# Patient Record
Sex: MALE | Race: WHITE | NOT HISPANIC OR LATINO | Employment: FULL TIME | URBAN - METROPOLITAN AREA
[De-identification: names, ages, dates, MRNs, and addresses within clinical notes are randomized per-mention and may not be internally consistent; named-entity substitution may affect disease eponyms.]

---

## 2023-07-18 ENCOUNTER — OFFICE VISIT (OUTPATIENT)
Dept: INTERNAL MEDICINE | Facility: CLINIC | Age: 33
End: 2023-07-18
Payer: COMMERCIAL

## 2023-07-18 VITALS
DIASTOLIC BLOOD PRESSURE: 73 MMHG | HEART RATE: 63 BPM | BODY MASS INDEX: 23.57 KG/M2 | SYSTOLIC BLOOD PRESSURE: 110 MMHG | OXYGEN SATURATION: 98 % | WEIGHT: 183.7 LBS | HEIGHT: 74 IN

## 2023-07-18 DIAGNOSIS — Z79.01 LONG TERM CURRENT USE OF ANTICOAGULANT THERAPY: ICD-10-CM

## 2023-07-18 DIAGNOSIS — Z00.00 ROUTINE GENERAL MEDICAL EXAMINATION AT A HEALTH CARE FACILITY: Primary | ICD-10-CM

## 2023-07-18 DIAGNOSIS — E80.6 HYPERBILIRUBINEMIA: ICD-10-CM

## 2023-07-18 DIAGNOSIS — E78.2 MIXED HYPERLIPIDEMIA: ICD-10-CM

## 2023-07-18 DIAGNOSIS — F10.10 EXCESSIVE DRINKING OF ALCOHOL: ICD-10-CM

## 2023-07-18 DIAGNOSIS — I82.4Y1 ACUTE DEEP VEIN THROMBOSIS (DVT) OF PROXIMAL END OF RIGHT LOWER EXTREMITY (H): ICD-10-CM

## 2023-07-18 PROBLEM — E04.1 NONTOXIC SINGLE THYROID NODULE: Status: ACTIVE | Noted: 2020-09-17

## 2023-07-18 PROBLEM — Z86.718 PERSONAL HISTORY OF DVT (DEEP VEIN THROMBOSIS): Status: ACTIVE | Noted: 2023-07-18

## 2023-07-18 PROBLEM — Z86.711 HISTORY OF PULMONARY EMBOLISM: Status: ACTIVE | Noted: 2020-09-06

## 2023-07-18 PROBLEM — I26.99 PULMONARY EMBOLI (H): Status: ACTIVE | Noted: 2020-09-06

## 2023-07-18 LAB
ALBUMIN SERPL BCG-MCNC: 5 G/DL (ref 3.5–5.2)
ALP SERPL-CCNC: 84 U/L (ref 40–129)
ALT SERPL W P-5'-P-CCNC: 95 U/L (ref 0–70)
ANION GAP SERPL CALCULATED.3IONS-SCNC: 15 MMOL/L (ref 7–15)
AST SERPL W P-5'-P-CCNC: 63 U/L (ref 0–45)
BILIRUB DIRECT SERPL-MCNC: 0.21 MG/DL (ref 0–0.3)
BILIRUB SERPL-MCNC: 0.9 MG/DL
BUN SERPL-MCNC: 7 MG/DL (ref 6–20)
CALCIUM SERPL-MCNC: 9.2 MG/DL (ref 8.6–10)
CHLORIDE SERPL-SCNC: 103 MMOL/L (ref 98–107)
CHOLEST SERPL-MCNC: 201 MG/DL
CREAT SERPL-MCNC: 0.96 MG/DL (ref 0.67–1.17)
DEPRECATED HCO3 PLAS-SCNC: 24 MMOL/L (ref 22–29)
ERYTHROCYTE [DISTWIDTH] IN BLOOD BY AUTOMATED COUNT: 11.9 % (ref 10–15)
GFR SERPL CREATININE-BSD FRML MDRD: >90 ML/MIN/1.73M2
GLUCOSE SERPL-MCNC: 80 MG/DL (ref 70–99)
HCT VFR BLD AUTO: 47.6 % (ref 40–53)
HDLC SERPL-MCNC: 71 MG/DL
HGB BLD-MCNC: 16.2 G/DL (ref 13.3–17.7)
LDLC SERPL CALC-MCNC: 112 MG/DL
MCH RBC QN AUTO: 32.9 PG (ref 26.5–33)
MCHC RBC AUTO-ENTMCNC: 34 G/DL (ref 31.5–36.5)
MCV RBC AUTO: 97 FL (ref 78–100)
NONHDLC SERPL-MCNC: 130 MG/DL
PLATELET # BLD AUTO: 209 10E3/UL (ref 150–450)
POTASSIUM SERPL-SCNC: 4.2 MMOL/L (ref 3.4–5.3)
PROT SERPL-MCNC: 7.4 G/DL (ref 6.4–8.3)
RBC # BLD AUTO: 4.93 10E6/UL (ref 4.4–5.9)
SODIUM SERPL-SCNC: 142 MMOL/L (ref 136–145)
TRIGL SERPL-MCNC: 91 MG/DL
WBC # BLD AUTO: 7.6 10E3/UL (ref 4–11)

## 2023-07-18 PROCEDURE — 99385 PREV VISIT NEW AGE 18-39: CPT | Performed by: INTERNAL MEDICINE

## 2023-07-18 PROCEDURE — 80061 LIPID PANEL: CPT | Performed by: INTERNAL MEDICINE

## 2023-07-18 PROCEDURE — 80053 COMPREHEN METABOLIC PANEL: CPT | Performed by: INTERNAL MEDICINE

## 2023-07-18 PROCEDURE — 36415 COLL VENOUS BLD VENIPUNCTURE: CPT | Performed by: INTERNAL MEDICINE

## 2023-07-18 PROCEDURE — 99214 OFFICE O/P EST MOD 30 MIN: CPT | Mod: 25 | Performed by: INTERNAL MEDICINE

## 2023-07-18 PROCEDURE — 82248 BILIRUBIN DIRECT: CPT | Performed by: INTERNAL MEDICINE

## 2023-07-18 PROCEDURE — 85027 COMPLETE CBC AUTOMATED: CPT | Performed by: INTERNAL MEDICINE

## 2023-07-18 RX ORDER — WARFARIN SODIUM 3 MG/1
TABLET ORAL
COMMUNITY
Start: 2023-02-21 | End: 2023-07-18

## 2023-07-18 ASSESSMENT — ENCOUNTER SYMPTOMS
PARESTHESIAS: 0
FEVER: 0
ABDOMINAL PAIN: 0
FREQUENCY: 0
DIARRHEA: 0
CONSTIPATION: 0
DIZZINESS: 0
NAUSEA: 0
CHILLS: 0
ARTHRALGIAS: 0
MYALGIAS: 0
HEARTBURN: 0
SORE THROAT: 0
SHORTNESS OF BREATH: 0
DYSURIA: 0
JOINT SWELLING: 0
NERVOUS/ANXIOUS: 0
EYE PAIN: 0
WEAKNESS: 0
HEMATOCHEZIA: 0
PALPITATIONS: 0
HEADACHES: 0
HEMATURIA: 0
COUGH: 0

## 2023-07-18 NOTE — PROGRESS NOTES
SUBJECTIVE:   CC: Graham is an 32 year old who presents for preventative health visit.     Healthy Habits:     Getting at least 3 servings of Calcium per day:  Yes    Bi-annual eye exam:  NO    Dental care twice a year:  NO    Sleep apnea or symptoms of sleep apnea:  None    Diet:  Regular (no restrictions)    Frequency of exercise:  4-5 days/week    Duration of exercise:  Greater than 60 minutes    Taking medications regularly:  Yes    Medication side effects:  Not applicable    Additional concerns today:  Yes    Graham presents today for a physical exam. This is the first time I have met Graham. We also discussed his warfarin use. He stopped taking it ~3 months ago. He does not desire to go back on it. He drinks 3-4 drinks per night every night.    Today's PHQ-2 Score:       7/18/2023    12:34 PM   PHQ-2 ( 1999 [employer])   Q1: Little interest or pleasure in doing things 0   Q2: Feeling down, depressed or hopeless 1   PHQ-2 Score 1   Q1: Little interest or pleasure in doing things Not at all   Q2: Feeling down, depressed or hopeless Several days   PHQ-2 Score 1     Social History     Tobacco Use     Smoking status: Never     Smokeless tobacco: Never   Substance Use Topics     Alcohol use: Not Currently     Alcohol/week: 7.0 standard drinks of alcohol     Types: 7 Standard drinks or equivalent per week     Comment: daily         7/18/2023    12:34 PM   Alcohol Use   Prescreen: >3 drinks/day or >7 drinks/week? Yes   AUDIT SCORE  10         7/18/2023    12:34 PM   AUDIT - Alcohol Use Disorders Identification Test - Reproduced from the World Health Organization Audit 2001 (Second Edition)   1.  How often do you have a drink containing alcohol? 4 or more times a week   2.  How many drinks containing alcohol do you have on a typical day when you are drinking? 3 or 4   3.  How often do you have five or more drinks on one occasion? Weekly   4.  How often during the last year have you found that you were not able to stop drinking  once you had started? Never   5.  How often during the last year have you failed to do what was normally expected of you because of drinking? Never   6.  How often during the last year have you needed a first drink in the morning to get yourself going after a heavy drinking session? Less than monthly   7.  How often during the last year have you had a feeling of guilt or remorse after drinking? Less than monthly   8.  How often during the last year have you been unable to remember what happened the night before because of your drinking? Never   9.  Have you or someone else been injured because of your drinking? No   10. Has a relative, friend, doctor or other health care worker been concerned about your drinking or suggested you cut down? No   TOTAL SCORE 10     Reviewed orders with patient. Reviewed health maintenance and updated orders accordingly - Yes    Labs reviewed in EPIC    Reviewed and updated as needed this visit by clinical staff   Tobacco  Allergies  Meds  Problems  Med Hx  Surg Hx  Fam Hx  Soc   Hx      Reviewed and updated as needed this visit by Provider   Tobacco  Allergies  Meds  Problems  Med Hx  Surg Hx  Fam Hx         Review of Systems   Constitutional: Negative for chills and fever.   HENT: Negative for congestion, ear pain, hearing loss and sore throat.    Eyes: Negative for pain and visual disturbance.   Respiratory: Negative for cough and shortness of breath.    Cardiovascular: Negative for chest pain, palpitations and peripheral edema.   Gastrointestinal: Negative for abdominal pain, constipation, diarrhea, heartburn, hematochezia and nausea.   Genitourinary: Negative for dysuria, frequency, genital sores, hematuria, impotence, penile discharge and urgency.   Musculoskeletal: Negative for arthralgias, joint swelling and myalgias.   Skin: Negative for rash.   Neurological: Negative for dizziness, weakness, headaches and paresthesias.   Psychiatric/Behavioral: Negative for mood  "changes. The patient is not nervous/anxious.      OBJECTIVE:   /73   Pulse 63   Ht 1.873 m (6' 1.75\")   Wt 83.3 kg (183 lb 11.2 oz)   SpO2 98%   BMI 23.75 kg/m      Physical Exam  GENERAL: In no distress.  EYES: Conjunctivae/corneas clear. EOMs grossly intact.  HENT: NC/AT, facies symmetric. Neck supple. No LAD or thyromegaly noted.  RESP: CTAB. No w/r/r.  CV: RRR, no m/r/g.  GI: NT, ND, without rebound or guarding, no CVA tenderness, Spencer's sign negative  MSK: Moves all four extremities freely.  SKIN: No significant ulcers, lesions or rashes on the visualized portions of the skin  NEURO: Alert. Oriented.  PSYCH: Linear thought process. Speech normal rate and volume. No tangential thoughts, hallucinations, or delusions.    Diagnostic Test Results: Labs reviewed in Epic    ASSESSMENT/PLAN:   Routine general medical examination at a health care facility  Reviewed PMH. Discussed healthcare maintenance issues, including cancer screenings, relevant immunizations, and cardiac risk factor screenings such as for cholesterol, HTN, and DM.  - PRIMARY CARE FOLLOW-UP SCHEDULING; Future    Excessive drinking of alcohol  Reports he drinks 25-30 drinks per week. Reviewed with him that his 11/2022 showed macrocytosis, transaminitis, and hyperbilirubinemia, possibly all due to excessive alcohol use. Strongly recommended he cut back. He admitted he drinks 'too much'. He was open to addiction medicine referral today which I did place.  - Adult Mental Health  Referral; Future    Long term current use of anticoagulant therapy  Acute deep vein thrombosis (DVT) of proximal end of right lower extremity (H)  Reviewed with Graham that I am not a thrombosis specialists but to my knowledge 2 unprovoked clots (with a strong FH of clots in his siblings) would lead me to recommend lifelong anticoagulation. He has been off warfarin for ~3 months. He does not desire to go back on it. Per notes, he apparently had extensions of " clots while on DOAC and Lovenox and thus was recommended he stay on warfarin indefinitely. He was able to verbalize the risks of not being on anticoagulation, including having another clot (including one that could be fatal). I offered formal thrombosis/hematology referral which he deferred for today. I again stated my recommendation would be he resumes anticoagulation indefinitely, and he thanked me for my recommendation but plans to remain off of it for now.  - Basic metabolic panel; Future  - CBC with platelets; Future    Mixed hyperlipidemia  Fasting labs today.  - Lipid panel reflex to direct LDL Fasting; Future    Hyperbilirubinemia  Seen on 2022 lab work. He denies known history of Gilbert's disease.  - Hepatic function panel; Future    COUNSELING:   Reviewed preventive health counseling, as reflected in patient instructions    He reports that he has never smoked. He has never used smokeless tobacco.    Dhiraj Corona MD  [facility]

## 2023-07-18 NOTE — PATIENT INSTRUCTIONS
- Our team will contact you via [employer] (if you sign up for it), telephone call (if results are urgent), or otherwise via letter in the mail with the results of today's lab tests once I have a chance to review them

## 2023-07-19 ENCOUNTER — TELEPHONE (OUTPATIENT)
Dept: ANTICOAGULATION | Facility: CLINIC | Age: 33
End: 2023-07-19
Payer: COMMERCIAL

## 2023-07-19 DIAGNOSIS — I26.99 PULMONARY EMBOLISM (H): ICD-10-CM

## 2023-07-19 DIAGNOSIS — Z86.711 HISTORY OF PULMONARY EMBOLISM: ICD-10-CM

## 2023-07-19 DIAGNOSIS — Z79.01 LONG TERM CURRENT USE OF ANTICOAGULANT THERAPY: Primary | ICD-10-CM

## 2023-07-19 DIAGNOSIS — I82.4Y1 ACUTE DEEP VEIN THROMBOSIS (DVT) OF PROXIMAL END OF RIGHT LOWER EXTREMITY (H): ICD-10-CM

## 2023-07-19 RX ORDER — WARFARIN SODIUM 3 MG/1
TABLET ORAL
Qty: 78 TABLET | Refills: 1 | Status: SHIPPED | OUTPATIENT
Start: 2023-07-19

## 2023-07-19 NOTE — LETTER
7/19/2023        RE: Graham Doyle  [address] [facility] [address]  [place] [address]

## 2023-07-19 NOTE — TELEPHONE ENCOUNTER
"ANTICOAGULATION  MANAGEMENT: NEW REFERRAL      SUBJECTIVE/OBJECTIVE     Graham Doyle, a 32 year old male  is newly referred to [facility].    Anticoagulation:    Previously on warfarin: Yes,  patient transferring from [facility] in [place], [place].  Approximate previous dose: 1.5 mg Mon, Wed, Fri; 3 mg all other days. Using 3 mg tablets. Possibly, per chart review, last INR was 2023?  Warfarin initiation date (approximate):  for DVT, saddle PE, stopped warfarin after 8 mo, then another small DVT rt leg and restarted warfarin.  Tried and failed Eliquis.    Indication(s): DVT   Goal Range: 2.0-3.0   Anticoagulation Bridge/Overlap: No   Referring provider: from PCP    General Dietary/Social Hx:    Typical vitamin K intake: low; consistent     Other dietary considerations: None     Social History:   Social History     Tobacco Use     Smoking status: Former     Types: Cigarettes     Quit date: 2022     Years since quittin.5     Smokeless tobacco: Never   Substance Use Topics     Alcohol use: Not Currently     Alcohol/week: 7.0 standard drinks of alcohol     Types: 7 Standard drinks or equivalent per week     Comment: daily     Drug use: Not Currently   h/o CBD gummies for sleep, h/o excessive ETOH use    In the past 2 weeks, patient estimates taking medications as instructed % of time: 100    Results:        No results for input(s): INR, [id number], F2, ALMWH in the last 168 hours.    Wt Readings from Last 2 Encounters:   23 83.3 kg (183 lb 11.2 oz)      Estimated body mass index is 23.75 kg/m  as calculated from the following:    Height as of 23: 1.873 m (6' 1.75\").    Weight as of 23: 83.3 kg (183 lb 11.2 oz).  Lab Results   Component Value Date    AST 63 (H) 2023     Lab Results   Component Value Date    CR 0.96 2023     Estimated Creatinine Clearance: 127.7 mL/min (based on SCr of 0.96 mg/dL).    ASSESSMENT       Goal INR 2-3, " standard for indication(s) above    On warfarin > 30 days; maintenance dose has been established    Maintenance dose established prior to referral     Patient states he was evaluated by Hematology in [place] and they did not find any genetic condition for why he had a DVT/PE or repeat. They only thing is it was in 2020 and 2021 and he had Covid 3x despite having all his vaccinations/boosters, so possibly related to that? He is open to seeing Hematology here and would like to possibly stop taking warfarin with the thought that his DVTs were provoked. He does note his sister had a DVT when she was pregnant and his brother had a DVT after a 17-hour long plane ride.   PLAN     Dosing Instructions: Continue your current warfarin dose with INR this week.      Summary  As of 7/19/2023    Full warfarin instructions:  1.5 mg every Mon, Wed, Fri; 3 mg all other days   Next INR check:  7/26/2023             Education provided:     Please call back if any changes to your diet, medications or how you've been taking warfarin    Taking warfarin: purpose of warfarin and how it works, take warfarin at same time each day; preferably in the evening, prescribed tablet strength and color, importance of following [facility] instructions vs instructions on the prescription bottle and Importance of taking warfarin as instructed    Goal range and lab monitoring: goal range and significance of current result, Importance of therapeutic range and Importance of following up at instructed interval    Dietary considerations: importance of consistent vitamin K intake, impact of vitamin K foods on INR, vitamin K content of foods, Impact of protein intake on INR  and importance of notifying [facility] to changes in diet    Healthy lifestyle considerations: potential interaction between warfarin and alcohol, limit alcohol intake to no more than 1 to 2 drinks in 24 hours if you choose to drink alcohol, avoid excessive alcohol drinking (binge drinking) while on  warfarin due to increased risk of bleeding from effect on INR and fall risk, impact of smoking or tobacco on INR, impact of exercise/activity level on INR and impact of CBD, marijuana, or medical marijuana on INR    Interaction IS anticipated between warfarin and many medications including prescription, OTC, herbal, supplements and especially NSAIDs and antibiotics    Symptom monitoring: monitoring for bleeding signs and symptoms, monitoring for clotting signs and symptoms, monitoring for stroke signs and symptoms, when to seek medical attention/emergency care, if you hit your head or have a bad fall seek emergency care and travel related clotting risk and prevention    Importance of notifying anticoagulation clinic for: changes in medications; a sooner lab recheck maybe needed, diarrhea, nausea/vomiting, reduced intake, cold/flu, and/or infections; a sooner lab recheck maybe needed, upcoming surgeries and procedures 2 weeks in advance and if you did not receive dosing instructions on the same day as your labs were checked    Written instructions provided    Contact [phone]  with any changes, questions or concerns.     Education still needed:     None required      Telephone call with Graham who verbalizes understanding and agrees to plan    Lab visit scheduled    Standing orders placed in Epic: Point of Care INR (Lab 5000)    Plan made per [facility] anticoagulation protocol    Santa Garvin, RN  Anticoagulation Clinic  7/19/2023

## 2023-07-26 ENCOUNTER — LAB (OUTPATIENT)
Dept: LAB | Facility: CLINIC | Age: 33
End: 2023-07-26
Payer: COMMERCIAL

## 2023-07-26 ENCOUNTER — ANTICOAGULATION THERAPY VISIT (OUTPATIENT)
Dept: ANTICOAGULATION | Facility: CLINIC | Age: 33
End: 2023-07-26

## 2023-07-26 DIAGNOSIS — Z86.711 HISTORY OF PULMONARY EMBOLISM: ICD-10-CM

## 2023-07-26 DIAGNOSIS — I26.99 PULMONARY EMBOLISM (H): ICD-10-CM

## 2023-07-26 DIAGNOSIS — Z79.01 LONG TERM CURRENT USE OF ANTICOAGULANT THERAPY: ICD-10-CM

## 2023-07-26 DIAGNOSIS — I82.4Y1 ACUTE DEEP VEIN THROMBOSIS (DVT) OF PROXIMAL END OF RIGHT LOWER EXTREMITY (H): ICD-10-CM

## 2023-07-26 DIAGNOSIS — I82.4Y1 ACUTE DEEP VEIN THROMBOSIS (DVT) OF PROXIMAL END OF RIGHT LOWER EXTREMITY (H): Primary | ICD-10-CM

## 2023-07-26 LAB — INR BLD: 1.4 (ref 0.9–1.1)

## 2023-07-26 PROCEDURE — 85610 PROTHROMBIN TIME: CPT

## 2023-07-26 PROCEDURE — 36416 COLLJ CAPILLARY BLOOD SPEC: CPT

## 2023-07-26 NOTE — PROGRESS NOTES
ANTICOAGULATION MANAGEMENT     Graham Doyle 32 year old male is on warfarin with subtherapeutic INR result. (Goal INR 2.0-3.0)    Recent labs: (last 7 days)     07/26/23  0838   INR 1.4*       ASSESSMENT     Source(s): Chart Review and Patient/Caregiver Call     Warfarin doses taken: Warfarin taken as instructed  Diet: Change in alcohol intake may be affecting INR. Having significantly less alcohol intake than when he was on his previous warfarin dose back in [place] . This is an ongoing change  Medication/supplement changes: None noted  New illness, injury, or hospitalization: No  Signs or symptoms of bleeding or clotting: No  Previous result:  therapeutic in [place] , but not changed since February 2023  Additional findings: None       PLAN     Recommended plan for ongoing change(s) affecting INR     Dosing Instructions: Increase your warfarin dose (27% change) with next INR in 5 days       Summary  As of 7/26/2023      Full warfarin instructions:  3 mg every day   Next INR check:  7/31/2023               Telephone call with Graham who verbalizes understanding and agrees to plan    Lab visit scheduled    Education provided:   Please call back if any changes to your diet, medications or how you've been taking warfarin  Healthy lifestyle considerations: potential interaction between warfarin and alcohol, limit alcohol intake to no more than 1 to 2 drinks in 24 hours if you choose to drink alcohol, avoid excessive alcohol drinking (binge drinking) while on warfarin due to increased risk of bleeding from effect on INR and fall risk, and impact of CBD, marijuana, or medical marijuana on INR  Contact [phone]  with any changes, questions or concerns.     Plan made with [facility] Pharmacist Bibi Garvin, RN  Anticoagulation Clinic  7/26/2023    _______________________________________________________________________     Anticoagulation Episode Summary       Current INR goal:  2.0-3.0    TTR:  --   Target end date:  Indefinite   Send INR reminders to:  [facility]    Indications    Acute deep vein thrombosis (DVT) of proximal end of right lower extremity (H) [I82.4Y1]  Long term current use of anticoagulant therapy [Z79.01]  Pulmonary embolism (H) [I26.99]             Comments:  first started warfarin in 2020 with DVT/saddle PE, on warfarin x8 mo then stopped, had recurrent DVT several months later and restarted warfarin             Anticoagulation Care Providers       Provider Role Specialty Phone number    Dhiraj Martinez MD Referring Internal Medicine [phone]

## 2023-07-31 ENCOUNTER — ANTICOAGULATION THERAPY VISIT (OUTPATIENT)
Dept: ANTICOAGULATION | Facility: CLINIC | Age: 33
End: 2023-07-31

## 2023-07-31 ENCOUNTER — LAB (OUTPATIENT)
Dept: LAB | Facility: CLINIC | Age: 33
End: 2023-07-31
Payer: COMMERCIAL

## 2023-07-31 DIAGNOSIS — Z86.711 HISTORY OF PULMONARY EMBOLISM: ICD-10-CM

## 2023-07-31 DIAGNOSIS — I26.99 PULMONARY EMBOLISM (H): ICD-10-CM

## 2023-07-31 DIAGNOSIS — Z79.01 LONG TERM CURRENT USE OF ANTICOAGULANT THERAPY: ICD-10-CM

## 2023-07-31 DIAGNOSIS — I82.4Y1 ACUTE DEEP VEIN THROMBOSIS (DVT) OF PROXIMAL END OF RIGHT LOWER EXTREMITY (H): Primary | ICD-10-CM

## 2023-07-31 DIAGNOSIS — I82.4Y1 ACUTE DEEP VEIN THROMBOSIS (DVT) OF PROXIMAL END OF RIGHT LOWER EXTREMITY (H): ICD-10-CM

## 2023-07-31 LAB — INR BLD: 1.8 (ref 0.9–1.1)

## 2023-07-31 PROCEDURE — 85610 PROTHROMBIN TIME: CPT

## 2023-07-31 PROCEDURE — 36416 COLLJ CAPILLARY BLOOD SPEC: CPT

## 2023-07-31 NOTE — PROGRESS NOTES
ANTICOAGULATION MANAGEMENT     Graham Doyle 32 year old male is on warfarin with subtherapeutic INR result. (Goal INR 2.0-3.0)    Recent labs: (last 7 days)     07/31/23  1417   INR 1.8*       ASSESSMENT     Source(s): Chart Review and Patient/Caregiver Call     Warfarin doses taken: Warfarin taken as instructed  Diet: No new diet changes identified  Medication/supplement changes: None noted  New illness, injury, or hospitalization: No  Signs or symptoms of bleeding or clotting: No  Previous result: Supratherapeutic  Additional findings: None       PLAN     Recommended plan for no diet, medication or health factor changes affecting INR     Dosing Instructions: Increase your warfarin dose (7.1% change) with next INR in 4 days       Summary  As of 7/31/2023      Full warfarin instructions:  4.5 mg every Mon; 3 mg all other days   Next INR check:  8/4/2023               Telephone call with Graham who verbalizes understanding and agrees to plan    Lab visit scheduled    Education provided:   Please call back if any changes to your diet, medications or how you've been taking warfarin    Plan made with [facility] Pharmacist Bibi Tidwell RN  Anticoagulation Clinic  7/31/2023    _______________________________________________________________________     Anticoagulation Episode Summary       Current INR goal:  2.0-3.0   TTR:  0.0 % (3 d)   Target end date:  Indefinite   Send INR reminders to:  [facility]    Indications    Acute deep vein thrombosis (DVT) of proximal end of right lower extremity (H) [I82.4Y1]  Long term current use of anticoagulant therapy [Z79.01]  Pulmonary embolism (H) [I26.99]             Comments:  first started warfarin in 2020 with DVT/saddle PE, on warfarin x8 mo then stopped, had recurrent DVT several months later and restarted warfarin             Anticoagulation Care Providers       Provider Role Specialty Phone number    Dhiraj Martinez MD Referring  Internal Medicine [phone]

## 2023-08-04 ENCOUNTER — ANTICOAGULATION THERAPY VISIT (OUTPATIENT)
Dept: ANTICOAGULATION | Facility: CLINIC | Age: 33
End: 2023-08-04

## 2023-08-04 ENCOUNTER — LAB (OUTPATIENT)
Dept: LAB | Facility: CLINIC | Age: 33
End: 2023-08-04
Payer: COMMERCIAL

## 2023-08-04 DIAGNOSIS — Z86.711 HISTORY OF PULMONARY EMBOLISM: ICD-10-CM

## 2023-08-04 DIAGNOSIS — I26.99 PULMONARY EMBOLISM (H): ICD-10-CM

## 2023-08-04 DIAGNOSIS — I82.4Y1 ACUTE DEEP VEIN THROMBOSIS (DVT) OF PROXIMAL END OF RIGHT LOWER EXTREMITY (H): ICD-10-CM

## 2023-08-04 DIAGNOSIS — Z79.01 LONG TERM CURRENT USE OF ANTICOAGULANT THERAPY: ICD-10-CM

## 2023-08-04 DIAGNOSIS — I82.4Y1 ACUTE DEEP VEIN THROMBOSIS (DVT) OF PROXIMAL END OF RIGHT LOWER EXTREMITY (H): Primary | ICD-10-CM

## 2023-08-04 LAB — INR BLD: 2.2 (ref 0.9–1.1)

## 2023-08-04 PROCEDURE — 85610 PROTHROMBIN TIME: CPT

## 2023-08-04 PROCEDURE — 36416 COLLJ CAPILLARY BLOOD SPEC: CPT

## 2023-08-04 NOTE — PROGRESS NOTES
ANTICOAGULATION MANAGEMENT     Graham Doyle 32 year old male is on warfarin with therapeutic INR result. (Goal INR 2.0-3.0)    Recent labs: (last 7 days)     08/04/23  1530   INR 2.2*       ASSESSMENT     Source(s): Chart Review and Patient/Caregiver Call     Warfarin doses taken: Warfarin taken as instructed  Diet: No new diet changes identified  Medication/supplement changes: None noted  New illness, injury, or hospitalization: No  Signs or symptoms of bleeding or clotting: No  Previous result: Subtherapeutic  Additional findings: None       PLAN     Recommended plan for no diet, medication or health factor changes affecting INR     Dosing Instructions: Continue your current warfarin dose with next INR in 1 week       Summary  As of 8/4/2023      Full warfarin instructions:  4.5 mg every Mon; 3 mg all other days   Next INR check:  8/11/2023               Telephone call with Graham who verbalizes understanding and agrees to plan    Lab visit scheduled    Education provided:   Please call back if any changes to your diet, medications or how you've been taking warfarin  Healthy lifestyle considerations: potential interaction between warfarin and alcohol, limit alcohol intake to no more than 1 to 2 drinks in 24 hours if you choose to drink alcohol, and avoid excessive alcohol drinking (binge drinking) while on warfarin due to increased risk of bleeding from effect on INR and fall risk  Contact [phone]  with any changes, questions or concerns.     Plan made per [facility] anticoagulation protocol    Santa Garvin RN  Anticoagulation Clinic  8/4/2023    _______________________________________________________________________     Anticoagulation Episode Summary       Current INR goal:  2.0-3.0   TTR:  26.5 % (1 wk)   Target end date:  Indefinite   [facility]    Indications    Acute deep vein thrombosis (DVT) of proximal end of right lower extremity (H) [I82.4Y1]  Long term  current use of anticoagulant therapy [Z79.01]  Pulmonary embolism (H) [I26.99]             Comments:  first started warfarin in 2020 with DVT/saddle PE, on warfarin x8 mo then stopped, had recurrent DVT several months later and restarted warfarin             Anticoagulation Care Providers       Provider Role Specialty Phone number    Dhiraj Martinez MD Referring Internal Medicine [phone]

## 2023-08-04 NOTE — PROGRESS NOTES
ANTICOAGULATION MANAGEMENT     Graham Doyle 32 year old male is on warfarin with therapeutic INR result. (Goal INR 2.0-3.0)    Recent labs: (last 7 days)     08/04/23  1530   INR 2.2*       ASSESSMENT     Source(s): Chart Review  Previous INR was Subtherapeutic  Medication, diet, health changes since last INR chart reviewed; none identified  Pt transferred from [place], overall has been drinking less ETOH since moving here so needing more weekly warfarin for lower INR labs. Finally therapeutic this week.     PLAN     Unable to reach Graham today.    Left message to 3 mg each night this weekend. Request call back for assessment.    Follow up required to confirm warfarin dose taken and assess for changes    Santa Garvin, RN  Anticoagulation Clinic  8/4/2023

## 2023-08-11 ENCOUNTER — LAB (OUTPATIENT)
Dept: LAB | Facility: CLINIC | Age: 33
End: 2023-08-11
Payer: COMMERCIAL

## 2023-08-11 ENCOUNTER — ANTICOAGULATION THERAPY VISIT (OUTPATIENT)
Dept: ANTICOAGULATION | Facility: CLINIC | Age: 33
End: 2023-08-11

## 2023-08-11 DIAGNOSIS — Z79.01 LONG TERM CURRENT USE OF ANTICOAGULANT THERAPY: ICD-10-CM

## 2023-08-11 DIAGNOSIS — I26.99 PULMONARY EMBOLISM (H): ICD-10-CM

## 2023-08-11 DIAGNOSIS — I82.4Y1 ACUTE DEEP VEIN THROMBOSIS (DVT) OF PROXIMAL END OF RIGHT LOWER EXTREMITY (H): ICD-10-CM

## 2023-08-11 DIAGNOSIS — I82.4Y1 ACUTE DEEP VEIN THROMBOSIS (DVT) OF PROXIMAL END OF RIGHT LOWER EXTREMITY (H): Primary | ICD-10-CM

## 2023-08-11 DIAGNOSIS — Z86.711 HISTORY OF PULMONARY EMBOLISM: ICD-10-CM

## 2023-08-11 LAB — INR BLD: 2.9 (ref 0.9–1.1)

## 2023-08-11 PROCEDURE — 36416 COLLJ CAPILLARY BLOOD SPEC: CPT

## 2023-08-11 PROCEDURE — 85610 PROTHROMBIN TIME: CPT

## 2023-08-11 NOTE — PROGRESS NOTES
ANTICOAGULATION MANAGEMENT     Graham Doyle 32 year old male is on warfarin with therapeutic INR result. (Goal INR 2.0-3.0)    Recent labs: (last 7 days)     08/11/23  0920   INR 2.9*       ASSESSMENT     Source(s): Chart Review and Patient/Caregiver Call     Warfarin doses taken: Warfarin taken as instructed  Diet: Was out of town over the weekend so extra greens but also alittle more alcohol  Medication/supplement changes: None noted  New illness, injury, or hospitalization: No  Signs or symptoms of bleeding or clotting: No  Previous result: Therapeutic last visit; previously outside of goal range  Additional findings: None       PLAN     Recommended plan for no diet, medication or health factor changes affecting INR     Dosing Instructions: Continue your current warfarin dose with next INR in 1 week       Summary  As of 8/11/2023      Full warfarin instructions:  4.5 mg every Mon; 3 mg all other days   Next INR check:  8/18/2023               Telephone call with Graham who verbalizes understanding and agrees to plan    Lab visit scheduled    Education provided:   Please call back if any changes to your diet, medications or how you've been taking warfarin    Plan made per [facility] anticoagulation protocol    Carolee Tidwell RN  Anticoagulation Clinic  8/11/2023    _______________________________________________________________________     Anticoagulation Episode Summary       Current INR goal:  2.0-3.0   TTR:  60.9 % (2 wk)   Target end date:  Indefinite   Send INR reminders to:  [facility]    Indications    Acute deep vein thrombosis (DVT) of proximal end of right lower extremity (H) [I82.4Y1]  Long term current use of anticoagulant therapy [Z79.01]  Pulmonary embolism (H) [I26.99]             Comments:  first started warfarin in 2020 with DVT/saddle PE, on warfarin x8 mo then stopped, had recurrent DVT several months later and restarted warfarin             Anticoagulation Care Providers        Provider Role Specialty Phone number    Dhiraj Martinez MD Referring Internal Medicine [phone]

## 2023-08-18 ENCOUNTER — LAB (OUTPATIENT)
Dept: LAB | Facility: CLINIC | Age: 33
End: 2023-08-18
Payer: COMMERCIAL

## 2023-08-18 ENCOUNTER — ANTICOAGULATION THERAPY VISIT (OUTPATIENT)
Dept: ANTICOAGULATION | Facility: CLINIC | Age: 33
End: 2023-08-18

## 2023-08-18 DIAGNOSIS — I82.4Y1 ACUTE DEEP VEIN THROMBOSIS (DVT) OF PROXIMAL END OF RIGHT LOWER EXTREMITY (H): Primary | ICD-10-CM

## 2023-08-18 DIAGNOSIS — I26.99 PULMONARY EMBOLISM (H): ICD-10-CM

## 2023-08-18 DIAGNOSIS — Z79.01 LONG TERM CURRENT USE OF ANTICOAGULANT THERAPY: ICD-10-CM

## 2023-08-18 DIAGNOSIS — Z86.711 HISTORY OF PULMONARY EMBOLISM: ICD-10-CM

## 2023-08-18 DIAGNOSIS — I82.4Y1 ACUTE DEEP VEIN THROMBOSIS (DVT) OF PROXIMAL END OF RIGHT LOWER EXTREMITY (H): ICD-10-CM

## 2023-08-18 LAB — INR BLD: 3.6 (ref 0.9–1.1)

## 2023-08-18 PROCEDURE — 85610 PROTHROMBIN TIME: CPT

## 2023-08-18 PROCEDURE — 36416 COLLJ CAPILLARY BLOOD SPEC: CPT

## 2023-08-18 NOTE — PROGRESS NOTES
ANTICOAGULATION MANAGEMENT     Graham Doyle 32 year old male is on warfarin with supratherapeutic INR result. (Goal INR 2.0-3.0)    Recent labs: (last 7 days)     08/18/23  0745   INR 3.6*       ASSESSMENT     Source(s): Chart Review and Patient/Caregiver Call     Warfarin doses taken: Warfarin taken as instructed  Diet: Decreased greens/vitamin K in diet; plans to resume previous intake He does have some alcohol but will try and lessen that some  Medication/supplement changes: None noted  New illness, injury, or hospitalization: No  Signs or symptoms of bleeding or clotting: No  Previous result: Therapeutic last 2(+) visits  Additional findings: None       PLAN     Recommended plan for temporary change(s) affecting INR     Dosing Instructions: decrease your warfarin dose (6.7% change) with next INR in 2 weeks       Summary  As of 8/18/2023      Full warfarin instructions:  8/18: 1.5 mg; Otherwise 3 mg every day   Next INR check:  9/1/2023               Telephone call with Graham who verbalizes understanding and agrees to plan    Lab visit scheduled    Education provided:   Please call back if any changes to your diet, medications or how you've been taking warfarin    Plan made per [facility] anticoagulation protocol    Carolee Tidwell RN  Anticoagulation Clinic  8/18/2023    _______________________________________________________________________     Anticoagulation Episode Summary       Current INR goal:  2.0-3.0   TTR:  45.8 % (3 wk)   Target end date:  Indefinite   Send INR reminders to:  [facility]    Indications    Acute deep vein thrombosis (DVT) of proximal end of right lower extremity (H) [I82.4Y1]  Long term current use of anticoagulant therapy [Z79.01]  Pulmonary embolism (H) [I26.99]             Comments:  first started warfarin in 2020 with DVT/saddle PE, on warfarin x8 mo then stopped, had recurrent DVT several months later and restarted warfarin             Anticoagulation Care  Providers       Provider Role Specialty Phone number    Dhiraj Martinez MD Referring Internal Medicine [phone]

## 2023-08-28 ENCOUNTER — MYC MEDICAL ADVICE (OUTPATIENT)
Dept: INTERNAL MEDICINE | Facility: CLINIC | Age: 33
End: 2023-08-28
Payer: COMMERCIAL

## 2023-08-28 ENCOUNTER — OFFICE VISIT (OUTPATIENT)
Dept: PEDIATRICS | Facility: CLINIC | Age: 33
End: 2023-08-28
Payer: COMMERCIAL

## 2023-08-28 ENCOUNTER — HOSPITAL ENCOUNTER (OUTPATIENT)
Dept: ULTRASOUND IMAGING | Facility: CLINIC | Age: 33
Discharge: HOME | End: 2023-08-28
Attending: PHYSICIAN ASSISTANT | Admitting: PHYSICIAN ASSISTANT
Payer: COMMERCIAL

## 2023-08-28 ENCOUNTER — NURSE TRIAGE (OUTPATIENT)
Dept: INTERNAL MEDICINE | Facility: CLINIC | Age: 33
End: 2023-08-28

## 2023-08-28 VITALS
RESPIRATION RATE: 18 BRPM | HEART RATE: 64 BPM | DIASTOLIC BLOOD PRESSURE: 79 MMHG | TEMPERATURE: 98.6 F | BODY MASS INDEX: 23.53 KG/M2 | WEIGHT: 182 LBS | SYSTOLIC BLOOD PRESSURE: 116 MMHG | OXYGEN SATURATION: 95 %

## 2023-08-28 DIAGNOSIS — Z79.01 LONG TERM CURRENT USE OF ANTICOAGULANT THERAPY: ICD-10-CM

## 2023-08-28 DIAGNOSIS — M25.471 RIGHT ANKLE SWELLING: ICD-10-CM

## 2023-08-28 DIAGNOSIS — I26.92 SADDLE EMBOLUS OF PULMONARY ARTERY, UNSPECIFIED CHRONICITY, UNSPECIFIED WHETHER ACUTE COR PULMONALE PRESENT (H): ICD-10-CM

## 2023-08-28 DIAGNOSIS — Z86.718 H/O DEEP VENOUS THROMBOSIS: ICD-10-CM

## 2023-08-28 DIAGNOSIS — M79.651 PAIN OF RIGHT THIGH: Primary | ICD-10-CM

## 2023-08-28 PROCEDURE — 93971 EXTREMITY STUDY: CPT | Mod: RT

## 2023-08-28 PROCEDURE — 99215 OFFICE O/P EST HI 40 MIN: CPT | Performed by: PHYSICIAN ASSISTANT

## 2023-08-28 NOTE — RESULT ENCOUNTER NOTE
Results discussed directly with patient while patient was present. Any further details documented in the note.   Laura Christopher PA-C

## 2023-08-28 NOTE — TELEPHONE ENCOUNTER
Triage huddled with [facility] provider and pt was informed he can be seen at 4:30 PM today. Pt verbalized agreement with plan. He was given [facility] information.

## 2023-08-28 NOTE — PROGRESS NOTES
Assessment & Plan     Pain of right thigh  H/O deep venous thrombosis  Saddle embolus of pulmonary artery, unspecified chronicity, unspecified whether acute cor pulmonale present (H)  Long term current use of anticoagulant therapy  Stat ultrasound reassuring const acute DVT.  Evidence of chronic DVT visible on ultrasound.  Suspect right thigh pain musculoskeletal in etiology.  Recommend Voltaren gel, heat or ice in 20-minute intervals, gentle massage, acetaminophen 1000 mg 3 times daily as needed.  If worsening or not improving follow-up with PCP or orthopedics.  Patient voiced understanding and agreement.  - US Lower Extremity Venous Duplex Right    43 minutes spent by me on the date of the encounter doing chart review, history and exam, documentation and further activities per the note    Review of prior external note(s) from - [facility] information from [facility] reviewed     Return in about 2 weeks (around 9/11/2023) for Evaluation if worsening or not improving.    Laura Christopher PA-C  [facility] DUANE Stevenson is a 32 year old, presenting for the following health issues:  Deep Vein Thrombosis (R lower extremity pain X 2 days , POS for DVT 2020, hx of saddle PE)         No data to display                HPI     Evaluation for possible DVT  Onset/Duration: X 2 days  Description:       Location: Right lower extremity       Redness: no        Pain: 4/10       Warmth: YES       Joint swelling YES- ankle swelling  Progression of symptoms same  Accompanying signs and symptoms:       Fevers: no        Numbness/tingling/weakness: YES- yesterday       Chest pain/pleurisy: no        Shortness of breath: no   History        Trauma: no         Recent travel/when: no         Previous history of DVT: YES- DVT R leg - unprovoked (was initially on Xarelto but changed to coumadin as saddle PE was noted/concern for progression), Saddle PE 2020        Family history of DVT: YES-  sister during pregnancy, brother post ACL surgery, then flight.        Recent surgery: no   Aggravating factors include: sitting  Therapies tried and outcome: none, Tylenol PRN, still currently taking Warfarin  Prior surgery on arteries of veins in this area: No    IVC filter removed 11/20/2020. (Placed 9/7/2020).  No hypercoagulability condition noted personally or in family.  Had work-up in 2020.    Review of Systems   Constitutional, HEENT, cardiovascular, pulmonary, GI, , musculoskeletal, neuro, skin, endocrine and psych systems are negative, except as otherwise noted.      Objective    /79 (BP Location: Right arm, Patient Position: Chair, Cuff Size: Adult Regular)   Pulse 64   Temp 98.6  F (37  C) (Oral)   Resp 18   Wt 82.6 kg (182 lb)   SpO2 95%   BMI 23.53 kg/m    Body mass index is 23.53 kg/m .  Physical Exam   GENERAL: healthy, alert and no distress  EYES: Eyes grossly normal to inspection, PERRL and conjunctivae and sclerae normal  RESP: lungs clear to auscultation - no rales, rhonchi or wheezes  CV: regular rate and rhythm, normal S1 S2, no S3 or S4, no murmur, click or rub, no peripheral edema and peripheral pulses strong  MS: Right thigh circumference 1 cm larger than contralateral side.  Tenderness along medial aspect of thigh to deep palpation.  No palpable abnormality in this region.  No redness or warmth noted of skin.  No calf tenderness in the entirety of the right lower extremity.  SKIN: no suspicious lesions or rashes  NEURO: Normal strength and tone, mentation intact and speech normal  PSYCH: mentation appears normal, affect normal/bright    Results for orders placed or performed during the hospital encounter of 08/28/23   US Lower Extremity Venous Duplex Right     Status: None    Narrative    EXAM: US LOWER EXTREMITY VENOUS DUPLEX RIGHT  LOCATION: [facility]  DATE: 8/28/2023    INDICATION: hx of DVT 2020   unprovoked w saddle PE a few days later   on  anticoagulation   swelling and pain of Right ankle x 2 days  COMPARISON: None available at the time of dictation  TECHNIQUE: Venous Duplex ultrasound of the right lower extremity with and without compression, augmentation and duplex. Color flow and spectral Doppler with waveform analysis performed.    FINDINGS: Exam includes the common femoral, femoral, popliteal, and contralateral common femoral veins as well as segmentally visualized deep calf veins and greater saphenous vein.     RIGHT: Partial compressibility of the distal popliteal vein with a slightly echogenic and linear intraluminal filling defect accounting for the partial compressibility, an appearance favoring sequelae of prior/chronic deep venous thrombosis. Flow is   preserved, and the visualized calf veins and remaining femoral - popliteal venous segments are fully compressible and widely patent. There is no convincing evidence of acute DVT.      Impression    IMPRESSION:    Findings in the distal popliteal vein favoring the sequelae of prior/chronic deep venous thrombosis with no convincing evidence of acute DVT. If clinical symptoms are persistent or worsen, repeat imaging could be considered

## 2023-08-28 NOTE — TELEPHONE ENCOUNTER
Pt reports pain radiating from behind his right knee to his groin see my chart message below. Pt denies chest pain, leg swelling or injury. Has hx of DVT and is currently taking warfarin. Pt can go to [facility] today. Message was left requesting call back from [facility] provider.     Over the last few days the pain in my right leg has migrated up behind my knee and almost to the groin. I have been very tired too. Do you recommend I visit to get an ultrasound or anything?     Thank you.     Graham    Additional Information   Negative: Sounds like a life-threatening emergency to the triager   Negative: Chest pain   Negative: Small area of swelling and followed an insect bite to the area   Negative: Followed a knee injury   Negative: Ankle or foot injury   Negative: Pregnant with leg swelling or edema   Negative: Difficulty breathing at rest   Negative: Entire foot is cool or blue in comparison to other side   Negative: SEVERE swelling (e.g., swelling extends above knee, entire leg is swollen, weeping fluid)   Negative: Thigh or calf pain and only 1 side and present > 1 hour   Negative: Thigh, calf, or ankle swelling in only one leg    Protocols used: Leg Swelling and Edema-A-OH

## 2023-09-05 ENCOUNTER — LAB (OUTPATIENT)
Dept: LAB | Facility: CLINIC | Age: 33
End: 2023-09-05
Payer: COMMERCIAL

## 2023-09-05 ENCOUNTER — ANTICOAGULATION THERAPY VISIT (OUTPATIENT)
Dept: ANTICOAGULATION | Facility: CLINIC | Age: 33
End: 2023-09-05

## 2023-09-05 DIAGNOSIS — I26.99 PULMONARY EMBOLISM (H): ICD-10-CM

## 2023-09-05 DIAGNOSIS — Z86.711 HISTORY OF PULMONARY EMBOLISM: ICD-10-CM

## 2023-09-05 DIAGNOSIS — I82.4Y1 ACUTE DEEP VEIN THROMBOSIS (DVT) OF PROXIMAL END OF RIGHT LOWER EXTREMITY (H): Primary | ICD-10-CM

## 2023-09-05 DIAGNOSIS — I82.4Y1 ACUTE DEEP VEIN THROMBOSIS (DVT) OF PROXIMAL END OF RIGHT LOWER EXTREMITY (H): ICD-10-CM

## 2023-09-05 DIAGNOSIS — Z79.01 LONG TERM CURRENT USE OF ANTICOAGULANT THERAPY: ICD-10-CM

## 2023-09-05 LAB — INR BLD: 2.2 (ref 0.9–1.1)

## 2023-09-05 PROCEDURE — 85610 PROTHROMBIN TIME: CPT

## 2023-09-05 PROCEDURE — 36416 COLLJ CAPILLARY BLOOD SPEC: CPT

## 2023-09-05 NOTE — PROGRESS NOTES
ANTICOAGULATION MANAGEMENT     Graham Doyle 32 year old male is on warfarin with therapeutic INR result. (Goal INR 2.0-3.0)    Recent labs: (last 7 days)     09/05/23  1436   INR 2.2*       ASSESSMENT     Source(s): Chart Review and Patient/Caregiver Call     Warfarin doses taken: Warfarin taken as instructed  Diet: No new diet changes identified  Medication/supplement changes: None noted  New illness, injury, or hospitalization: No - patient did have an US done last week, no new DVT   Signs or symptoms of bleeding or clotting: No  Previous result: Supratherapeutic  Additional findings: None       PLAN     Recommended plan for no diet, medication or health factor changes affecting INR     Dosing Instructions: Continue your current warfarin dose with next INR in 3 weeks       Summary  As of 9/5/2023      Full warfarin instructions:  3 mg every day   Next INR check:  9/25/2023               Telephone call with Graham who verbalizes understanding and agrees to plan    Lab visit scheduled    Education provided:   Importance of notifying anticoagulation clinic for: changes in medications; a sooner lab recheck maybe needed and diarrhea, nausea/vomiting, reduced intake, cold/flu, and/or infections; a sooner lab recheck maybe needed    Plan made per [facility] anticoagulation protocol    Nancy Wellington, RN  Anticoagulation Clinic  9/5/2023    _______________________________________________________________________     Anticoagulation Episode Summary       Current INR goal:  2.0-3.0   TTR:  51.0 % (1.3 mo)   Target end date:  Indefinite   Send INR reminders to:  [facility]    Indications    Acute deep vein thrombosis (DVT) of proximal end of right lower extremity (H) [I82.4Y1]  Long term current use of anticoagulant therapy [Z79.01]  Pulmonary embolism (H) [I26.99]             Comments:  first started warfarin in 2020 with DVT/saddle PE, on warfarin x8 mo then stopped, had recurrent DVT several months later  and restarted warfarin             Anticoagulation Care Providers       Provider Role Specialty Phone number    Dhiraj Martinez MD Referring Internal Medicine [phone]

## 2023-09-25 ENCOUNTER — ANTICOAGULATION THERAPY VISIT (OUTPATIENT)
Dept: ANTICOAGULATION | Facility: CLINIC | Age: 33
End: 2023-09-25

## 2023-09-25 ENCOUNTER — LAB (OUTPATIENT)
Dept: LAB | Facility: CLINIC | Age: 33
End: 2023-09-25
Payer: COMMERCIAL

## 2023-09-25 DIAGNOSIS — I82.4Y1 ACUTE DEEP VEIN THROMBOSIS (DVT) OF PROXIMAL END OF RIGHT LOWER EXTREMITY (H): Primary | ICD-10-CM

## 2023-09-25 DIAGNOSIS — I82.4Y1 ACUTE DEEP VEIN THROMBOSIS (DVT) OF PROXIMAL END OF RIGHT LOWER EXTREMITY (H): ICD-10-CM

## 2023-09-25 DIAGNOSIS — Z86.711 HISTORY OF PULMONARY EMBOLISM: ICD-10-CM

## 2023-09-25 DIAGNOSIS — Z79.01 LONG TERM CURRENT USE OF ANTICOAGULANT THERAPY: ICD-10-CM

## 2023-09-25 DIAGNOSIS — I26.99 PULMONARY EMBOLISM (H): ICD-10-CM

## 2023-09-25 LAB — INR BLD: 2.8 (ref 0.9–1.1)

## 2023-09-25 PROCEDURE — 85610 PROTHROMBIN TIME: CPT

## 2023-09-25 PROCEDURE — 36416 COLLJ CAPILLARY BLOOD SPEC: CPT

## 2023-09-25 NOTE — PROGRESS NOTES
ANTICOAGULATION MANAGEMENT     Graham Doyle 33 year old male is on warfarin with therapeutic INR result. (Goal INR 2.0-3.0)    Recent labs: (last 7 days)     09/25/23  1000   INR 2.8*       ASSESSMENT     Source(s): Chart Review  Previous INR was Therapeutic last visit; previously outside of goal range  Medication, diet, health changes since last INR chart reviewed; none identified         PLAN     Recommended plan for no diet, medication or health factor changes affecting INR     Dosing Instructions: Continue your current warfarin dose with next INR in 4 weeks       Summary  As of 9/25/2023      Full warfarin instructions:  3 mg every day   Next INR check:  10/23/2023               Detailed voice message left for Graham with dosing instructions and follow up date.     Contact [phone]  to schedule and with any changes, questions or concerns.     Education provided:   Please call back if any changes to your diet, medications or how you've been taking warfarin  Contact [phone]  with any changes, questions or concerns.     Plan made per [facility] anticoagulation protocol    Santa Garvin RN  Anticoagulation Clinic  9/25/2023    _______________________________________________________________________     Anticoagulation Episode Summary       Current INR goal:  2.0-3.0   TTR:  67.3 % (2 mo)   Target end date:  Indefinite   Send INR reminders to:  [facility]    Indications    Acute deep vein thrombosis (DVT) of proximal end of right lower extremity (H) [I82.4Y1]  Long term current use of anticoagulant therapy [Z79.01]  Pulmonary embolism (H) [I26.99]             Comments:  first started warfarin in 2020 with DVT/saddle PE, on warfarin x8 mo then stopped, had recurrent DVT several months later and restarted warfarin             Anticoagulation Care Providers       Provider Role Specialty Phone number    Dhiraj Martinez MD Referring Internal Medicine [phone]

## 2023-11-10 ENCOUNTER — TELEPHONE (OUTPATIENT)
Dept: ANTICOAGULATION | Facility: CLINIC | Age: 33
End: 2023-11-10
Payer: COMMERCIAL

## 2023-11-10 NOTE — TELEPHONE ENCOUNTER
ANTICOAGULATION     Graham Doyle is overdue for an INR check.     Spoke with Graham and scheduled lab appointment on 11/17/2023.    Mason Forde RN

## 2023-11-20 ENCOUNTER — TELEPHONE (OUTPATIENT)
Dept: ANTICOAGULATION | Facility: CLINIC | Age: 33
End: 2023-11-20
Payer: COMMERCIAL

## 2023-11-20 NOTE — LETTER
[facility] ANTICOAGULATION CLINIC  [address] [facility] [address]  Phone: [phone]  Fax: [phone]   November 20, 2023        Graham Doyle  [address]  [place] [address]            Dear Graham,    You are currently under the care of [facility] Anticoagulation [facility] for your warfarin (Coumadin , Jantoven ) therapy.  We are contacting you because our records show you were due for an INR on 10/23/23.    There are potentially serious risks when taking warfarin without careful monitoring and we want to make sure you are safely managed.  Routine lab monitoring is required for warfarin refills.     Please call [phone]  as soon as possible to schedule a lab appointment. If it is difficult for you to get to lab, please call us to discuss options.  If there has been a change in your care or other concerns, please let us know so we can help and/or update our records.         Sincerely,       [facility] Anticoagulation Clinic

## 2023-11-20 NOTE — TELEPHONE ENCOUNTER
ANTICOAGULATION     Graham Doyle is overdue for an INR check.     Reminder letter sent    Haim Burton RN

## 2023-11-27 ENCOUNTER — TELEPHONE (OUTPATIENT)
Dept: INTERNAL MEDICINE | Facility: CLINIC | Age: 33
End: 2023-11-27
Payer: COMMERCIAL

## 2023-11-27 DIAGNOSIS — I82.4Y1 ACUTE DEEP VEIN THROMBOSIS (DVT) OF PROXIMAL END OF RIGHT LOWER EXTREMITY (H): ICD-10-CM

## 2023-11-27 DIAGNOSIS — Z86.711 HISTORY OF PULMONARY EMBOLISM: ICD-10-CM

## 2023-11-27 DIAGNOSIS — Z79.01 LONG TERM CURRENT USE OF ANTICOAGULANT THERAPY: ICD-10-CM

## 2023-11-27 RX ORDER — WARFARIN SODIUM 3 MG/1
TABLET ORAL
Qty: 78 TABLET | Refills: 1 | Status: CANCELLED | OUTPATIENT
Start: 2023-11-27

## 2023-11-28 NOTE — TELEPHONE ENCOUNTER
Patient long overdue for INR check. Please reach out to patient to discuss if he plans to continue taking this medication. During my one visit with him, he told me he does NOT plan to continue warfarin. If he does plan, please assist him in scheduling an appointment for an INR and a follow-up appointment with me.

## 2023-11-29 NOTE — TELEPHONE ENCOUNTER
Patient states he did mention stopping Warfarin at his appt with Dr. Martinez, but has since restarted the medication.     Writer relayed providers message regarding appointments needed for INR check/follow-up appts and offered to assist with scheduling those appointments.     Patient declines scheduling appointments at this time, [place] and has no estimated time that he will return. Writer recommended patient call clinic to schedule these appointments as soon as he is able to. Patient said okay.

## 2023-11-30 NOTE — TELEPHONE ENCOUNTER
Pt in [place]. Plans to return to [place] in about a week. Relayed provider message. Pt verbalizes understanding and will contact clinic to schedule lab appointment when he returns.     Reinforced importance of taking medication as prescribed and INR checks. Pt verbalizes understanding.

## 2023-12-04 ENCOUNTER — TELEPHONE (OUTPATIENT)
Dept: ANTICOAGULATION | Facility: CLINIC | Age: 33
End: 2023-12-04
Payer: COMMERCIAL

## 2023-12-04 NOTE — LETTER
[facility] ANTICOAGULATION CLINIC  [address] [facility] [address]  Phone: [phone]  Fax: [phone]   December 4, 2023        Graham Doyle  [address]  [place] [address]            Dear Graham,    You are currently under the care of [facility] Anticoagulation [facility] for your warfarin (Coumadin , Jantoven ) therapy.  We are contacting you because our records show you were due for an INR on 10/23/23.    There are potentially serious risks when taking warfarin without careful monitoring and we want to make sure you are safely managed.  Routine lab monitoring is required for warfarin refills.     Please call [phone]  as soon as possible to schedule a lab appointment. If it is difficult for you to get to lab, please call us to discuss options.  If there has been a change in your care or other concerns, please let us know so we can help and/or update our records.         Sincerely,       [facility] Anticoagulation Clinic

## 2023-12-04 NOTE — TELEPHONE ENCOUNTER
Anticoagulation Clinic Notification    Graham, is past due for an INR. Their last result was 2.8 on 9/25/23 and was due to come back on 10/23/23.    he received phone calls and letters over the last several weeks in attempt to arrange follow up labs. Graham Doyle will be contacted again today.     Please contact patient directly to discuss compliance with monitoring or schedule visit to review ongoing anticoagulation therapy.    Thank you,     [facility] Anticoagulation Clinic

## 2024-01-02 ENCOUNTER — TELEPHONE (OUTPATIENT)
Dept: ANTICOAGULATION | Facility: CLINIC | Age: 34
End: 2024-01-02
Payer: COMMERCIAL

## 2024-01-02 NOTE — LETTER
[facility] ANTICOAGULATION CLINIC  [address] [facility] [address]  Phone: [phone]  Fax: [phone]   January 2, 2024        Graham Doyle  [address]  [place] [place] [address]            Dear Graham,    You are currently under the care of [facility] Anticoagulation [facility] for your warfarin (Coumadin , Jantoven ) therapy.  We are contacting you because our records show you were due for an INR on 10/23/23.    There are potentially serious risks when taking warfarin without careful monitoring and we want to make sure you are safely managed.  Routine lab monitoring is required for warfarin refills.     Please call [phone]  as soon as possible to schedule a lab appointment. If it is difficult for you to get to lab, please call us to discuss options.  If there has been a change in your care or other concerns, please let us know so we can help and/or update our records.         Sincerely,       [facility] Anticoagulation Clinic

## 2024-01-02 NOTE — TELEPHONE ENCOUNTER
ANTICOAGULATION MANAGEMENT PROGRAM    Dr. Martinez,     Our records indicate that Graham Doyle remains past due to check an INR. Graham Doyle was contacted multiple times over at least the last 8 weeks to attempt to arrange a follow up appointment.    Graham Doyle last had an an INR checked on 9/25/23 and was due for follow up on 10/23/23     Called patient,Reminder letter sent     At this time Graham Doyle will be moved to noncompliant status within the program until their referral expires on 7/18/2024. While in noncompliant status the patient would continue to be contacted every 6 weeks by the anticoagulation program to attempt to schedule patient. You will be notified of each contact attempt to make you aware of patient's ongoing noncompliance.        Thank you,     [facility] Anticoagulation Management Program

## 2024-02-13 ENCOUNTER — TELEPHONE (OUTPATIENT)
Dept: ANTICOAGULATION | Facility: CLINIC | Age: 34
End: 2024-02-13
Payer: COMMERCIAL

## 2024-02-13 NOTE — LETTER
[facility] ANTICOAGULATION CLINIC  [address] [facility] [address]  Phone: [phone]  Fax: [phone]   February 13, 2024        Graham Doyle  [address]  [facility] [place] [facility] [address]            Dear Graham,    You are currently under the care of [facility] Anticoagulation [facility] for your warfarin (Coumadin , Jantoven ) therapy.  We are contacting you because our records show you were due for an INR on 10/23/23.    There are potentially serious risks when taking warfarin without careful monitoring and we want to make sure you are safely managed.  Routine lab monitoring is required for warfarin refills.     Please call [phone]  as soon as possible to schedule a lab appointment. If it is difficult for you to get to lab, please call us to discuss options.  If there has been a change in your care or other concerns, please let us know so we can help and/or update our records.         Sincerely,       [facility] Anticoagulation Clinic     Impression: Vitreous degeneration, bilateral: H43.813.  Bilateral. Plan: Stable OU, RDW

## 2024-02-13 NOTE — TELEPHONE ENCOUNTER
Anticoagulation Management    Graham Doyle is being followed by the anticoagulation clinic while in noncompliant status. Graham Doyle is receiving every 6 week reminder calls.     Last INR checked on 9/25/23    Called and Reminder letter sent    Lory Burton RN   [facility] Anticoagulation Clinic

## 2024-03-26 ENCOUNTER — TELEPHONE (OUTPATIENT)
Dept: ANTICOAGULATION | Facility: CLINIC | Age: 34
End: 2024-03-26
Payer: COMMERCIAL

## 2024-03-26 NOTE — TELEPHONE ENCOUNTER
Anticoagulation Management    Graham Doyle is being followed by the anticoagulation clinic while in noncompliant status. Graham Doyle is receiving every 6 week reminder calls.     Last INR checked on 9/25/23    Called and Was unable to reach patient and was unable to leave a voicemail. If patient calls, please schedule INR check as soon as possible.   Letter also mailed

## 2024-03-26 NOTE — LETTER
[facility] ANTICOAGULATION CLINIC  [address] [facility] [address]  Phone: [phone]  Fax: [phone]   March 26, 2024        Graham Doyle  [address]  [place] [facility] [address]            Dear Graham,    You are currently under the care of [facility] Anticoagulation [facility] for your warfarin (Coumadin , Jantoven ) therapy.  We are contacting you because our records show you were due for an INR on 10/23/23.    There are potentially serious risks when taking warfarin without careful monitoring and we want to make sure you are safely managed.  Routine lab monitoring is required for warfarin refills.     Please call [phone]  as soon as possible to schedule a lab appointment. If it is difficult for you to get to lab, please call us to discuss options.  If there has been a change in your care or other concerns, please let us know so we can help and/or update our records.         Sincerely,       [facility] Anticoagulation Clinic

## 2024-05-07 ENCOUNTER — TELEPHONE (OUTPATIENT)
Dept: ANTICOAGULATION | Facility: CLINIC | Age: 34
End: 2024-05-07
Payer: COMMERCIAL

## 2024-05-07 NOTE — LETTER
[facility] ANTICOAGULATION CLINIC  [address] [facility] [address]  Phone: [phone]  Fax: [phone]   May 7, 2024        Graham Doyle  [address]  [place] [address]            Dear Graham,    You are currently under the care of [facility] Anticoagulation [facility] for your warfarin (Coumadin , Jantoven ) therapy.  We are contacting you because our records show you were due for an INR on 10/23/24.    There are potentially serious risks when taking warfarin without careful monitoring and we want to make sure you are safely managed.  Routine lab monitoring is required for warfarin refills.     Please call [phone]  as soon as possible to schedule a lab appointment. If it is difficult for you to get to lab, please call us to discuss options.  If there has been a change in your care or other concerns, please let us know so we can help and/or update our records.         Sincerely,       [facility] Anticoagulation Clinic

## 2024-05-07 NOTE — TELEPHONE ENCOUNTER
Anticoagulation Management     Graham Doyle is being followed by the anticoagulation clinic while in noncompliant status. Graham Doyle is receiving every 6 week reminder calls.      Last INR checked on 9/25/23     Called (full VM) and Reminder letter sent     Lory Burton RN   [facility] Anticoagulation Clinic

## 2024-06-18 ENCOUNTER — TELEPHONE (OUTPATIENT)
Dept: ANTICOAGULATION | Facility: CLINIC | Age: 34
End: 2024-06-18
Payer: COMMERCIAL

## 2024-06-18 NOTE — LETTER
[facility]TICOAGULATION [facility]INIC  [address] [facility] [address]  Phone: [phone]  Fax: [phone]   June 18, 2024        Graham Doyle  [address]  [facility] [address]            Dear Graham,    You are currently under the care of [facility] for your warfarin (Coumadin , Jantoven ) therapy.  We are contacting you because our records show you were due for an INR on 10/23/24.    There are potentially serious risks when taking warfarin without careful monitoring and we want to make sure you are safely managed.  Routine lab monitoring is required for warfarin refills.     Please call [phone]  as soon as possible to schedule a lab appointment. If it is difficult for you to get to lab, please call us to discuss options.  If there has been a change in your care or other concerns, please let us know so we can help and/or update our records.         Sincerely,       [facility]

## 2024-06-18 NOTE — TELEPHONE ENCOUNTER
Anticoagulation Management     Graham Doyle is being followed by the anticoagulation clinic while in noncompliant status. Graham Doyle is receiving every 6 week reminder calls.      Last INR checked on 9/25/23     Called (full VM) and Reminder letter sent     Lory Burton RN   [facility]

## 2024-07-30 ENCOUNTER — TELEPHONE (OUTPATIENT)
Dept: ANTICOAGULATION | Facility: CLINIC | Age: 34
End: 2024-07-30
Payer: COMMERCIAL

## 2024-07-30 DIAGNOSIS — Z79.01 LONG TERM CURRENT USE OF ANTICOAGULANT THERAPY: ICD-10-CM

## 2024-07-30 DIAGNOSIS — I26.99 PULMONARY EMBOLISM (H): ICD-10-CM

## 2024-07-30 DIAGNOSIS — I82.4Y1 ACUTE DEEP VEIN THROMBOSIS (DVT) OF PROXIMAL END OF RIGHT LOWER EXTREMITY (H): Primary | ICD-10-CM

## 2024-07-30 NOTE — LETTER
[facility] ANTICO[facility]ULATION [facility]IN[facility]  [address] [facility] [address]  Phone: [phone]  Fax: [phone]   July 30, 2024        Graham Doyle  [address]  [facility] [address]            Dear Graham,    You have been under the care of the [facility] for monitoring of your warfarin (Coumadin , Jantoven )  for your DVT.  Our records indicate that you are either past due to have your blood work checked or have not followed clinic staff recommendations. Your last an INR was on 9/25/2023.     We regret to inform you that since you have not responded to our phone calls and letters the [facility] will no longer be able to manage your warfarin therapy. To ensure safe use of your warfarin and to receive additional warfarin refills you will need to make an office visit with Dr. Martinez office as soon as possible to discuss your warfarin therapy. Please call [phone] or [phone] to schedule an appointment.       Sincerely,       [facility]

## 2024-07-30 NOTE — TELEPHONE ENCOUNTER
ANTICOAGULATION  MANAGEMENT    Graham Doyle is being discharged from the [facility] ([facility]).    Reason for discharge: non-compliance with Anticoagulation Management Program despite outreach.  Last  an INR was done on 9/25/23. Letter mailed to patient advising them to schedule visit with referring provider.       Anticoagulation episode resolved, ACC referral closed, and Standing order discontinued    Patient may be accepted back in ACC program after an office visit to discuss non-compliance and check an INR. Patient must be agreeable to follow the monitoring recommendations of the program and will need a new referral to be re-enrolled.    Haim Burton RN

## 2024-10-06 ENCOUNTER — HEALTH MAINTENANCE LETTER (OUTPATIENT)
Age: 34
End: 2024-10-06